# Patient Record
Sex: MALE | Race: WHITE | NOT HISPANIC OR LATINO | Employment: FULL TIME | URBAN - METROPOLITAN AREA
[De-identification: names, ages, dates, MRNs, and addresses within clinical notes are randomized per-mention and may not be internally consistent; named-entity substitution may affect disease eponyms.]

---

## 2019-07-08 ENCOUNTER — TELEPHONE (OUTPATIENT)
Dept: FAMILY MEDICINE CLINIC | Facility: CLINIC | Age: 39
End: 2019-07-08

## 2019-07-08 RX ORDER — DEXTROAMPHETAMINE SACCHARATE, AMPHETAMINE ASPARTATE, DEXTROAMPHETAMINE SULFATE AND AMPHETAMINE SULFATE 5; 5; 5; 5 MG/1; MG/1; MG/1; MG/1
20 TABLET ORAL 2 TIMES DAILY
Qty: 60 TABLET | Refills: 0 | Status: SHIPPED | OUTPATIENT
Start: 2019-07-08 | End: 2019-08-16 | Stop reason: SDUPTHER

## 2019-08-16 ENCOUNTER — TELEPHONE (OUTPATIENT)
Dept: FAMILY MEDICINE CLINIC | Facility: CLINIC | Age: 39
End: 2019-08-16

## 2019-08-16 RX ORDER — DEXTROAMPHETAMINE SACCHARATE, AMPHETAMINE ASPARTATE, DEXTROAMPHETAMINE SULFATE AND AMPHETAMINE SULFATE 5; 5; 5; 5 MG/1; MG/1; MG/1; MG/1
20 TABLET ORAL 2 TIMES DAILY
Qty: 60 TABLET | Refills: 0 | Status: SHIPPED | OUTPATIENT
Start: 2019-08-16 | End: 2019-09-25 | Stop reason: SDUPTHER

## 2019-08-16 NOTE — TELEPHONE ENCOUNTER
Patient left  requesting refill for     amphetamine-dextroamphetamine (ADDERALL) 20 MG    Patient states that he will be leaving town in the morning and is asking if script can be sent in today to [facility] .

## 2019-09-25 ENCOUNTER — TELEPHONE (OUTPATIENT)
Dept: FAMILY MEDICINE CLINIC | Facility: CLINIC | Age: 39
End: 2019-09-25

## 2019-09-25 RX ORDER — DEXTROAMPHETAMINE SACCHARATE, AMPHETAMINE ASPARTATE, DEXTROAMPHETAMINE SULFATE AND AMPHETAMINE SULFATE 5; 5; 5; 5 MG/1; MG/1; MG/1; MG/1
20 TABLET ORAL 2 TIMES DAILY
Qty: 60 TABLET | Refills: 0 | Status: SHIPPED | OUTPATIENT
Start: 2019-09-25 | End: 2019-11-07 | Stop reason: SDUPTHER

## 2019-09-25 NOTE — TELEPHONE ENCOUNTER
Patient called requesting refill for     amphetamine-dextroamphetamine (ADDERALL) 20 MG    Patient would like script sent to [facility]

## 2019-11-07 ENCOUNTER — TELEPHONE (OUTPATIENT)
Dept: FAMILY MEDICINE CLINIC | Facility: CLINIC | Age: 39
End: 2019-11-07

## 2019-11-07 RX ORDER — DEXTROAMPHETAMINE SACCHARATE, AMPHETAMINE ASPARTATE, DEXTROAMPHETAMINE SULFATE AND AMPHETAMINE SULFATE 5; 5; 5; 5 MG/1; MG/1; MG/1; MG/1
20 TABLET ORAL 2 TIMES DAILY
Qty: 60 TABLET | Refills: 0 | Status: SHIPPED | OUTPATIENT
Start: 2019-11-07 | End: 2019-12-17 | Stop reason: SDUPTHER

## 2019-12-17 ENCOUNTER — TELEPHONE (OUTPATIENT)
Dept: FAMILY MEDICINE CLINIC | Facility: CLINIC | Age: 39
End: 2019-12-17

## 2019-12-17 DIAGNOSIS — F98.8 ATTENTION DEFICIT DISORDER (ADD) WITHOUT HYPERACTIVITY: Primary | ICD-10-CM

## 2019-12-17 RX ORDER — DEXTROAMPHETAMINE SACCHARATE, AMPHETAMINE ASPARTATE, DEXTROAMPHETAMINE SULFATE AND AMPHETAMINE SULFATE 5; 5; 5; 5 MG/1; MG/1; MG/1; MG/1
20 TABLET ORAL 2 TIMES DAILY
Qty: 60 TABLET | Refills: 0 | Status: SHIPPED | OUTPATIENT
Start: 2019-12-17 | End: 2020-01-24 | Stop reason: SDUPTHER

## 2020-01-17 ENCOUNTER — OFFICE VISIT (OUTPATIENT)
Dept: FAMILY MEDICINE CLINIC | Facility: CLINIC | Age: 40
End: 2020-01-17

## 2020-01-17 VITALS
DIASTOLIC BLOOD PRESSURE: 80 MMHG | WEIGHT: 140 LBS | OXYGEN SATURATION: 98 % | TEMPERATURE: 97.9 F | HEIGHT: 66 IN | BODY MASS INDEX: 22.5 KG/M2 | SYSTOLIC BLOOD PRESSURE: 112 MMHG | HEART RATE: 74 BPM | RESPIRATION RATE: 16 BRPM

## 2020-01-17 DIAGNOSIS — M79.642 HAND PAIN, LEFT: ICD-10-CM

## 2020-01-17 DIAGNOSIS — M35.9 CONNECTIVE TISSUE DISEASE (HCC): Primary | ICD-10-CM

## 2020-01-17 DIAGNOSIS — M25.521 RIGHT ELBOW PAIN: ICD-10-CM

## 2020-01-17 DIAGNOSIS — M72.0 DUPUYTREN'S CONTRACTURE OF RIGHT HAND: ICD-10-CM

## 2020-01-17 PROBLEM — G47.00 INSOMNIA: Status: ACTIVE | Noted: 2017-12-08

## 2020-01-17 PROBLEM — Z00.00 PREVENTATIVE HEALTH CARE: Status: ACTIVE | Noted: 2019-02-01

## 2020-01-17 PROBLEM — T78.40XA ALLERGIC STATE: Status: ACTIVE | Noted: 2020-01-17

## 2020-01-17 PROBLEM — F90.9 ATTENTION-DEFICIT HYPERACTIVITY DISORDER: Status: ACTIVE | Noted: 2017-12-08

## 2020-01-17 LAB
ALBUMIN SERPL-MCNC: 4.8 G/DL (ref 3.5–5.2)
ALBUMIN/GLOB SERPL: 1.7 G/DL
ALP SERPL-CCNC: 90 U/L (ref 39–117)
ALT SERPL W P-5'-P-CCNC: 20 U/L (ref 1–41)
ANION GAP SERPL CALCULATED.3IONS-SCNC: 14.1 MMOL/L (ref 5–15)
AST SERPL-CCNC: 22 U/L (ref 1–40)
BILIRUB SERPL-MCNC: 0.5 MG/DL (ref 0.2–1.2)
BUN BLD-MCNC: 7 MG/DL (ref 6–20)
BUN/CREAT SERPL: 8.6 (ref 7–25)
CALCIUM SPEC-SCNC: 9.5 MG/DL (ref 8.6–10.5)
CHLORIDE SERPL-SCNC: 103 MMOL/L (ref 98–107)
CHROMATIN AB SERPL-ACNC: <10 IU/ML (ref 0–14)
CO2 SERPL-SCNC: 23.9 MMOL/L (ref 22–29)
CREAT BLD-MCNC: 0.81 MG/DL (ref 0.76–1.27)
CRP SERPL-MCNC: 0.12 MG/DL (ref 0–0.5)
ERYTHROCYTE [SEDIMENTATION RATE] IN BLOOD: 1 MM/HR (ref 0–15)
GFR SERPL CREATININE-BSD FRML MDRD: 107 ML/MIN/1.73
GLOBULIN UR ELPH-MCNC: 2.9 GM/DL
GLUCOSE BLD-MCNC: 78 MG/DL (ref 65–99)
POTASSIUM BLD-SCNC: 4.6 MMOL/L (ref 3.5–5.2)
PROT SERPL-MCNC: 7.7 G/DL (ref 6–8.5)
SODIUM BLD-SCNC: 141 MMOL/L (ref 136–145)

## 2020-01-17 PROCEDURE — 99213 OFFICE O/P EST LOW 20 MIN: CPT | Performed by: FAMILY MEDICINE

## 2020-01-17 PROCEDURE — 86140 C-REACTIVE PROTEIN: CPT | Performed by: FAMILY MEDICINE

## 2020-01-17 PROCEDURE — 80053 COMPREHEN METABOLIC PANEL: CPT | Performed by: FAMILY MEDICINE

## 2020-01-17 PROCEDURE — 86038 ANTINUCLEAR ANTIBODIES: CPT | Performed by: FAMILY MEDICINE

## 2020-01-17 PROCEDURE — 85652 RBC SED RATE AUTOMATED: CPT | Performed by: FAMILY MEDICINE

## 2020-01-17 PROCEDURE — 86431 RHEUMATOID FACTOR QUANT: CPT | Performed by: FAMILY MEDICINE

## 2020-01-17 RX ORDER — INDOMETHACIN 25 MG/1
25 CAPSULE ORAL 3 TIMES DAILY PRN
Qty: 90 CAPSULE | Refills: 6 | Status: SHIPPED | OUTPATIENT
Start: 2020-01-17 | End: 2020-02-16

## 2020-01-20 LAB — ANA SER QL: NEGATIVE

## 2020-01-21 ENCOUNTER — TELEPHONE (OUTPATIENT)
Dept: FAMILY MEDICINE CLINIC | Facility: CLINIC | Age: 40
End: 2020-01-21

## 2020-01-21 NOTE — TELEPHONE ENCOUNTER
Pt requesting to be called with his most recent labs results. Only call [phone] with results. Thank you.

## 2020-01-23 ENCOUNTER — TELEPHONE (OUTPATIENT)
Dept: FAMILY MEDICINE CLINIC | Facility: CLINIC | Age: 40
End: 2020-01-23

## 2020-01-24 DIAGNOSIS — F98.8 ATTENTION DEFICIT DISORDER (ADD) WITHOUT HYPERACTIVITY: ICD-10-CM

## 2020-01-24 RX ORDER — DEXTROAMPHETAMINE SACCHARATE, AMPHETAMINE ASPARTATE, DEXTROAMPHETAMINE SULFATE AND AMPHETAMINE SULFATE 5; 5; 5; 5 MG/1; MG/1; MG/1; MG/1
20 TABLET ORAL 2 TIMES DAILY
Qty: 60 TABLET | Refills: 0 | Status: SHIPPED | OUTPATIENT
Start: 2020-01-24 | End: 2020-03-04 | Stop reason: SDUPTHER

## 2020-03-04 DIAGNOSIS — F98.8 ATTENTION DEFICIT DISORDER (ADD) WITHOUT HYPERACTIVITY: ICD-10-CM

## 2020-03-04 RX ORDER — DEXTROAMPHETAMINE SACCHARATE, AMPHETAMINE ASPARTATE, DEXTROAMPHETAMINE SULFATE AND AMPHETAMINE SULFATE 5; 5; 5; 5 MG/1; MG/1; MG/1; MG/1
20 TABLET ORAL 2 TIMES DAILY
Qty: 60 TABLET | Refills: 0 | Status: SHIPPED | OUTPATIENT
Start: 2020-03-04 | End: 2020-04-03 | Stop reason: SDUPTHER

## 2020-03-04 NOTE — TELEPHONE ENCOUNTER
PT CALLED REQUESTING A REFILL FOR amphetamine-dextroamphetamine (ADDERALL) 20 MG tablet    AMANDA ODELL CONFIRMED     PT CALL BACK   [phone]

## 2020-03-17 PROBLEM — M35.9 CONNECTIVE TISSUE DISEASE: Status: ACTIVE | Noted: 2020-03-17

## 2020-03-17 NOTE — ASSESSMENT & PLAN NOTE
Patient has Dupuytren's contracture involving his middle and ring finger.  It is not severe and I do not think it had anything to do with his pain.

## 2020-03-17 NOTE — ASSESSMENT & PLAN NOTE
Patient has right lateral epicondylitis.  This came on at the same time that the dorsum of his hand was inflamed and tender the epicondyle is still rather tender though and bothers him even on the day of this exam.  We will have him apply heat do exercises if it does not get better soon we will have to bring him back for an injection.

## 2020-03-17 NOTE — ASSESSMENT & PLAN NOTE
His hand pain is over the dorsum of his hand and was probably a tendinitis of some sort.  Possibly a fasciitis.  Practically gone at present time and his  is back to normal.

## 2020-03-17 NOTE — ASSESSMENT & PLAN NOTE
He describes a episode of inflammation in the dorsum of his hand and wrist that sounded very inflammatory.  I have to wonder about some sort of connective tissue disease which attacked  his hands and wrist.  He is much better but we will check his ALBERTINA and sed rate and see if there is any evidence of inflammatory antibodies in his system.

## 2020-04-03 DIAGNOSIS — F98.8 ATTENTION DEFICIT DISORDER (ADD) WITHOUT HYPERACTIVITY: ICD-10-CM

## 2020-04-03 RX ORDER — DEXTROAMPHETAMINE SACCHARATE, AMPHETAMINE ASPARTATE, DEXTROAMPHETAMINE SULFATE AND AMPHETAMINE SULFATE 5; 5; 5; 5 MG/1; MG/1; MG/1; MG/1
20 TABLET ORAL 2 TIMES DAILY
Qty: 60 TABLET | Refills: 0 | Status: SHIPPED | OUTPATIENT
Start: 2020-04-03 | End: 2020-05-29 | Stop reason: SDUPTHER

## 2020-04-03 NOTE — TELEPHONE ENCOUNTER
PT REQUESTED REFILLS FOR amphetamine-dextroamphetamine (ADDERALL) 20 MG tablet    PT CALLBACK [phone]    PHARMACY CONFIRMED

## 2020-05-29 DIAGNOSIS — F98.8 ATTENTION DEFICIT DISORDER (ADD) WITHOUT HYPERACTIVITY: ICD-10-CM

## 2020-05-29 NOTE — TELEPHONE ENCOUNTER
PT CALLED REQUESTING A REFILL FOR amphetamine-dextroamphetamine (Adderall) 20 MG tablet    AMANDA CONFIRMED     PT CALL BACK   [phone]

## 2020-05-30 RX ORDER — DEXTROAMPHETAMINE SACCHARATE, AMPHETAMINE ASPARTATE, DEXTROAMPHETAMINE SULFATE AND AMPHETAMINE SULFATE 5; 5; 5; 5 MG/1; MG/1; MG/1; MG/1
20 TABLET ORAL 2 TIMES DAILY
Qty: 60 TABLET | Refills: 0 | Status: SHIPPED | OUTPATIENT
Start: 2020-05-30 | End: 2020-07-17 | Stop reason: SDUPTHER

## 2020-07-17 DIAGNOSIS — F98.8 ATTENTION DEFICIT DISORDER (ADD) WITHOUT HYPERACTIVITY: ICD-10-CM

## 2020-07-17 NOTE — TELEPHONE ENCOUNTER
Patient is calling to request a  Refill of the following:    amphetamine-dextroamphetamine (Adderall) 20 MG tablet    [id number] - [place] - [address] - [phone]  - [phone] FX  [phone]    Patient call back [phone]

## 2020-07-18 RX ORDER — DEXTROAMPHETAMINE SACCHARATE, AMPHETAMINE ASPARTATE, DEXTROAMPHETAMINE SULFATE AND AMPHETAMINE SULFATE 5; 5; 5; 5 MG/1; MG/1; MG/1; MG/1
20 TABLET ORAL 2 TIMES DAILY
Qty: 60 TABLET | Refills: 0 | Status: SHIPPED | OUTPATIENT
Start: 2020-07-18 | End: 2020-08-25 | Stop reason: SDUPTHER

## 2020-08-25 DIAGNOSIS — F98.8 ATTENTION DEFICIT DISORDER (ADD) WITHOUT HYPERACTIVITY: ICD-10-CM

## 2020-08-25 RX ORDER — DEXTROAMPHETAMINE SACCHARATE, AMPHETAMINE ASPARTATE, DEXTROAMPHETAMINE SULFATE AND AMPHETAMINE SULFATE 5; 5; 5; 5 MG/1; MG/1; MG/1; MG/1
20 TABLET ORAL 2 TIMES DAILY
Qty: 60 TABLET | Refills: 0 | Status: SHIPPED | OUTPATIENT
Start: 2020-08-25 | End: 2020-10-12 | Stop reason: SDUPTHER

## 2020-08-25 NOTE — TELEPHONE ENCOUNTER
DELETE AFTER REVIEWING: Telephone encounter to be sent to the clinical pool.  If patient has less than a 3 day supply left, send the encounter HIGH Priority.    Caller: Tim Bladimir S    Relationship: Self    Best call back number:[phone]   Medication needed:   Requested Prescriptions     Pending Prescriptions Disp Refills   • amphetamine-dextroamphetamine (Adderall) 20 MG tablet 60 tablet 0     Sig: Take 1 tablet by mouth 2 (Two) Times a Day.       When do you need the refill by: Thursday     What details did the patient provide when requesting the medication:    Thursday   Does the patient have less than a 3 day supply:  [] Yes  [x] No    What is the patient's preferred pharmacy: [facility] #[id number] - [place], [place] - [address] AT [address] OF [address] - [phone]  - [phone] FX

## 2020-09-25 ENCOUNTER — TELEPHONE (OUTPATIENT)
Dept: FAMILY MEDICINE CLINIC | Facility: CLINIC | Age: 40
End: 2020-09-25

## 2020-09-25 NOTE — TELEPHONE ENCOUNTER
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     Fax # [phone]  Attn To Jagruti ANAYA      Thank you

## 2020-09-28 ENCOUNTER — TELEPHONE (OUTPATIENT)
Dept: FAMILY MEDICINE CLINIC | Facility: CLINIC | Age: 40
End: 2020-09-28

## 2020-09-28 NOTE — TELEPHONE ENCOUNTER
Patient called stated that he was notified by the office that the paperwork was faxed to  [facility]  Fax Number [phone].  Patient stated that they have not received the paperwork.  Please resend paperwork.    [phone]

## 2020-10-12 DIAGNOSIS — F98.8 ATTENTION DEFICIT DISORDER (ADD) WITHOUT HYPERACTIVITY: ICD-10-CM

## 2020-10-12 NOTE — TELEPHONE ENCOUNTER
PATIENT IS CALLING NEEDING TO GET A REFILL amphetamine-dextroamphetamine (Adderall) 20 MG tablet    PATIENT ONLY HAS 2 DAY LEFT      PHARMACY:  [facility] #[id number] - [place], [place] - [address] - [phone]  - [phone]         PT CONTACT@[phone]

## 2020-10-13 RX ORDER — DEXTROAMPHETAMINE SACCHARATE, AMPHETAMINE ASPARTATE, DEXTROAMPHETAMINE SULFATE AND AMPHETAMINE SULFATE 5; 5; 5; 5 MG/1; MG/1; MG/1; MG/1
20 TABLET ORAL 2 TIMES DAILY
Qty: 60 TABLET | Refills: 0 | Status: SHIPPED | OUTPATIENT
Start: 2020-10-13 | End: 2020-11-30 | Stop reason: SDUPTHER

## 2020-11-30 ENCOUNTER — TELEPHONE (OUTPATIENT)
Dept: FAMILY MEDICINE CLINIC | Facility: CLINIC | Age: 40
End: 2020-11-30

## 2020-11-30 DIAGNOSIS — F98.8 ATTENTION DEFICIT DISORDER (ADD) WITHOUT HYPERACTIVITY: ICD-10-CM

## 2020-11-30 RX ORDER — DEXTROAMPHETAMINE SACCHARATE, AMPHETAMINE ASPARTATE, DEXTROAMPHETAMINE SULFATE AND AMPHETAMINE SULFATE 5; 5; 5; 5 MG/1; MG/1; MG/1; MG/1
20 TABLET ORAL 2 TIMES DAILY
Qty: 60 TABLET | Refills: 0 | Status: SHIPPED | OUTPATIENT
Start: 2020-11-30 | End: 2021-01-12 | Stop reason: SDUPTHER

## 2020-11-30 NOTE — TELEPHONE ENCOUNTER
Caller: Bladimir Dumont    Relationship: Self    Best call back number: [phone]    Medication needed: amphetamine-dextroamphetamine (Adderall) 20 MG tablet    When do you need the refill by: ASAP     What details did the patient provide when requesting the medication patient is out of medication     Does the patient have less than a 3 day supply:  [x] Yes  [] No    What is the patient's preferred pharmacy:  [facility] #[id number] - [place], [place] - [address] - [phone]  - [phone] FX  [phone]

## 2021-01-12 DIAGNOSIS — F98.8 ATTENTION DEFICIT DISORDER (ADD) WITHOUT HYPERACTIVITY: ICD-10-CM

## 2021-01-12 RX ORDER — DEXTROAMPHETAMINE SACCHARATE, AMPHETAMINE ASPARTATE, DEXTROAMPHETAMINE SULFATE AND AMPHETAMINE SULFATE 5; 5; 5; 5 MG/1; MG/1; MG/1; MG/1
20 TABLET ORAL 2 TIMES DAILY
Qty: 60 TABLET | Refills: 0 | Status: SHIPPED | OUTPATIENT
Start: 2021-01-12 | End: 2021-01-18 | Stop reason: SDUPTHER

## 2021-01-12 NOTE — TELEPHONE ENCOUNTER
Caller: Bladimir Dumont S    Relationship: Self    Best call back number: [phone]      Medication needed:   Requested Prescriptions     Pending Prescriptions Disp Refills   • amphetamine-dextroamphetamine (Adderall) 20 MG tablet 60 tablet 0     Sig: Take 1 tablet by mouth 2 (Two) Times a Day.       When do you need the refill by: 1/15/21    What details did the patient provide when requesting the medication: PATIENT HAS A 3 DAY SUPPLY LEFT, GOT HIM IN FOR SOONEST APPOINTMENT WITH DR INFANTE IN MARCH.     Does the patient have less than a 3 day supply:  [] Yes  [x] No    What is the patient's preferred pharmacy: [id number] [facility] [place] [place] - [address] - [phone]  - [phone] FX

## 2021-01-18 ENCOUNTER — TELEPHONE (OUTPATIENT)
Dept: FAMILY MEDICINE CLINIC | Facility: CLINIC | Age: 41
End: 2021-01-18

## 2021-01-18 DIAGNOSIS — F98.8 ATTENTION DEFICIT DISORDER (ADD) WITHOUT HYPERACTIVITY: ICD-10-CM

## 2021-01-18 RX ORDER — DEXTROAMPHETAMINE SACCHARATE, AMPHETAMINE ASPARTATE, DEXTROAMPHETAMINE SULFATE AND AMPHETAMINE SULFATE 5; 5; 5; 5 MG/1; MG/1; MG/1; MG/1
20 TABLET ORAL 2 TIMES DAILY
Qty: 60 TABLET | Refills: 0 | Status: SHIPPED | OUTPATIENT
Start: 2021-01-18 | End: 2021-03-04 | Stop reason: SDUPTHER

## 2021-03-04 DIAGNOSIS — F98.8 ATTENTION DEFICIT DISORDER (ADD) WITHOUT HYPERACTIVITY: ICD-10-CM

## 2021-03-04 RX ORDER — DEXTROAMPHETAMINE SACCHARATE, AMPHETAMINE ASPARTATE, DEXTROAMPHETAMINE SULFATE AND AMPHETAMINE SULFATE 5; 5; 5; 5 MG/1; MG/1; MG/1; MG/1
20 TABLET ORAL 2 TIMES DAILY
Qty: 60 TABLET | Refills: 0 | Status: SHIPPED | OUTPATIENT
Start: 2021-03-04 | End: 2021-04-15 | Stop reason: SDUPTHER

## 2021-03-04 NOTE — TELEPHONE ENCOUNTER
Caller: Bladimir Dumont    Relationship: Self    Best call back number: [phone]     Medication needed:   Requested Prescriptions     Pending Prescriptions Disp Refills   • amphetamine-dextroamphetamine (Adderall) 20 MG tablet 60 tablet 0     Sig: Take 1 tablet by mouth 2 (Two) Times a Day for 30 days.       When do you need the refill by: TODAY    What details did the patient provide when requesting the medication:   2 DAYS OF MEDICATION REMAINING   Does the patient have less than a 3 day supply:  [x] Yes  [] No    What is the patient's preferred pharmacy: [facility] #[id number] - [facility] [place], [place] - [address] - [phone]  - [phone] FX

## 2021-03-17 ENCOUNTER — OFFICE VISIT (OUTPATIENT)
Dept: FAMILY MEDICINE CLINIC | Facility: CLINIC | Age: 41
End: 2021-03-17

## 2021-03-17 VITALS
BODY MASS INDEX: 27.97 KG/M2 | SYSTOLIC BLOOD PRESSURE: 114 MMHG | HEIGHT: 66 IN | DIASTOLIC BLOOD PRESSURE: 78 MMHG | HEART RATE: 87 BPM | TEMPERATURE: 97.1 F | WEIGHT: 174 LBS | OXYGEN SATURATION: 98 % | RESPIRATION RATE: 16 BRPM

## 2021-03-17 DIAGNOSIS — F43.0 ACUTE REACTION TO SITUATIONAL STRESS: Primary | ICD-10-CM

## 2021-03-17 DIAGNOSIS — F34.1 SECONDARY DYSTHYMIA: ICD-10-CM

## 2021-03-17 PROCEDURE — 99213 OFFICE O/P EST LOW 20 MIN: CPT | Performed by: FAMILY MEDICINE

## 2021-03-17 RX ORDER — FLUOXETINE HYDROCHLORIDE 20 MG/1
CAPSULE ORAL
Qty: 45 CAPSULE | Refills: 0 | Status: SHIPPED | OUTPATIENT
Start: 2021-03-17 | End: 2021-05-14

## 2021-03-17 RX ORDER — FLUOXETINE HYDROCHLORIDE 20 MG/1
CAPSULE ORAL
Qty: 45 CAPSULE | Refills: 0 | Status: SHIPPED | OUTPATIENT
Start: 2021-03-17 | End: 2021-03-17

## 2021-03-17 NOTE — ASSESSMENT & PLAN NOTE
Patient's depression is single episode and is moderate without psychosis. Their depression is currently active and the condition is newly identified. This will be reassessed in 4 weeks. F/U as described:patient will continue current medication therapy and patient was prescribed an antidepressant medicine.

## 2021-04-15 DIAGNOSIS — F98.8 ATTENTION DEFICIT DISORDER (ADD) WITHOUT HYPERACTIVITY: ICD-10-CM

## 2021-04-15 RX ORDER — DEXTROAMPHETAMINE SACCHARATE, AMPHETAMINE ASPARTATE, DEXTROAMPHETAMINE SULFATE AND AMPHETAMINE SULFATE 5; 5; 5; 5 MG/1; MG/1; MG/1; MG/1
20 TABLET ORAL 2 TIMES DAILY
Qty: 60 TABLET | Refills: 0 | Status: SHIPPED | OUTPATIENT
Start: 2021-04-15 | End: 2021-05-20 | Stop reason: SDUPTHER

## 2021-04-15 NOTE — TELEPHONE ENCOUNTER
.    Caller: Bladimir Dumont    Relationship: Self    Best call back number: [phone]    Medication needed:   Requested Prescriptions     Pending Prescriptions Disp Refills   • amphetamine-dextroamphetamine (Adderall) 20 MG tablet 60 tablet 0     Sig: Take 1 tablet by mouth 2 (Two) Times a Day for 30 days.       When do you need the refill by: TODAY    What additional details did the patient provide when requesting the medication: ONE DAY LEFT    Does the patient have less than a 3 day supply:  [x] Yes  [] No    What is the patient's preferred pharmacy:

## 2021-05-14 RX ORDER — FLUOXETINE HYDROCHLORIDE 20 MG/1
CAPSULE ORAL
Qty: 45 CAPSULE | Refills: 0 | Status: SHIPPED | OUTPATIENT
Start: 2021-05-14 | End: 2022-03-15

## 2021-05-20 DIAGNOSIS — F98.8 ATTENTION DEFICIT DISORDER (ADD) WITHOUT HYPERACTIVITY: ICD-10-CM

## 2021-05-24 RX ORDER — DEXTROAMPHETAMINE SACCHARATE, AMPHETAMINE ASPARTATE, DEXTROAMPHETAMINE SULFATE AND AMPHETAMINE SULFATE 5; 5; 5; 5 MG/1; MG/1; MG/1; MG/1
20 TABLET ORAL 2 TIMES DAILY
Qty: 60 TABLET | Refills: 0 | Status: SHIPPED | OUTPATIENT
Start: 2021-05-24 | End: 2021-06-21 | Stop reason: SDUPTHER

## 2021-05-24 NOTE — TELEPHONE ENCOUNTER
Caller: Iván Mckinney    Relationship: Self    Best call back number: [phone]    What is the best time to reach you: ANYTIME    Who are you requesting to speak with (clinical staff, provider,  specific staff member): CLINICAL STAFF    Do you know the name of the person who called: IVÁN MCKINNEY    What was the call regarding: PATIENT IS COMPLETELY OUT OF ADDERALL, WANTING TO CHECK ON REFILL    Do you require a callback:NO

## 2021-06-21 DIAGNOSIS — F98.8 ATTENTION DEFICIT DISORDER (ADD) WITHOUT HYPERACTIVITY: ICD-10-CM

## 2021-06-21 NOTE — TELEPHONE ENCOUNTER
D  Caller: Bladimir Mckinney    Relationship: Self    Best call back number:[phone]   Medication needed:   amphetamine-dextroamphetamine (Adderall) 20 MG tablet  20 mg, 2 Times Daily 0 ordered  EditCancel Reorder       Summary: Take 1 tablet by mouth 2 (Two) Times a Day for 30 days., Starting Mon 5/24/2021, Until Wed 6/23/2021, Normal                 When do you need the refill by: TODAY    What additional details did the patient provide when requesting the medication:     MR MCKINNEY IS WANTING AN EARLY RELEASE OF HIS MEDICATION, HE WILL BE [place]AV[place] ON Wednesday MORNING .     WILL BE GONE FOR 2 WEEKS, HE WILL BE OUT OF MEDICATION   Does the patient have less than a 3 day supply:  [] Yes  [x] No    What is the patient's preferred pharmacy:        [facility][id number] - [facility] [place] [place] - [address] - [phone]  - [phone]   [phone]

## 2021-06-22 RX ORDER — DEXTROAMPHETAMINE SACCHARATE, AMPHETAMINE ASPARTATE, DEXTROAMPHETAMINE SULFATE AND AMPHETAMINE SULFATE 5; 5; 5; 5 MG/1; MG/1; MG/1; MG/1
20 TABLET ORAL 2 TIMES DAILY
Qty: 60 TABLET | Refills: 0 | Status: SHIPPED | OUTPATIENT
Start: 2021-06-22 | End: 2021-07-26 | Stop reason: SDUPTHER

## 2021-07-26 DIAGNOSIS — F98.8 ATTENTION DEFICIT DISORDER (ADD) WITHOUT HYPERACTIVITY: ICD-10-CM

## 2021-07-26 NOTE — TELEPHONE ENCOUNTER
Caller: Bladimir Dumont    Relationship: Self    Best call back number:[phone]      Medication needed:   Requested Prescriptions     Pending Prescriptions Disp Refills   • amphetamine-dextroamphetamine (Adderall) 20 MG tablet 60 tablet 0     Sig: Take 1 tablet by mouth 2 (Two) Times a Day for 30 days.       When do you need the refill by: 7/26/21    What additional details did the patient provide when requesting the medication: TOOK LAST DOSE 7/26/21    Does the patient have less than a 3 day supply:  [x] Yes  [] No    What is the patient's preferred pharmacy: [id number] AT [facility] OF  [address] & [facility] - [phone] [place] [phone] FX

## 2021-07-27 RX ORDER — DEXTROAMPHETAMINE SACCHARATE, AMPHETAMINE ASPARTATE, DEXTROAMPHETAMINE SULFATE AND AMPHETAMINE SULFATE 5; 5; 5; 5 MG/1; MG/1; MG/1; MG/1
20 TABLET ORAL 2 TIMES DAILY
Qty: 60 TABLET | Refills: 0 | Status: SHIPPED | OUTPATIENT
Start: 2021-07-27 | End: 2021-09-02 | Stop reason: SDUPTHER

## 2021-09-02 DIAGNOSIS — F98.8 ATTENTION DEFICIT DISORDER (ADD) WITHOUT HYPERACTIVITY: ICD-10-CM

## 2021-09-02 RX ORDER — DEXTROAMPHETAMINE SACCHARATE, AMPHETAMINE ASPARTATE, DEXTROAMPHETAMINE SULFATE AND AMPHETAMINE SULFATE 5; 5; 5; 5 MG/1; MG/1; MG/1; MG/1
20 TABLET ORAL 2 TIMES DAILY
Qty: 60 TABLET | Refills: 0 | Status: SHIPPED | OUTPATIENT
Start: 2021-09-02 | End: 2021-10-20 | Stop reason: SDUPTHER

## 2021-09-02 NOTE — TELEPHONE ENCOUNTER
Caller: Bladimir Dumont    Relationship: Self    Best call back number: [phone]     Medication needed:   Requested Prescriptions     Pending Prescriptions Disp Refills   • amphetamine-dextroamphetamine (Adderall) 20 MG tablet 60 tablet 0     Sig: Take 1 tablet by mouth 2 (Two) Times a Day for 30 days.       When do you need the refill by: TODAY    What additional details did the patient provide when requesting the medication:   OUT OF MEDICATION    Does the patient have less than a 3 day supply:  [x] Yes  [] No    What is the patient's preferred pharmacy: [id number] AT [facility] OF  [address] & [facility] - [phone] [facility] [phone] FX

## 2021-10-20 DIAGNOSIS — F98.8 ATTENTION DEFICIT DISORDER (ADD) WITHOUT HYPERACTIVITY: ICD-10-CM

## 2021-10-20 RX ORDER — DEXTROAMPHETAMINE SACCHARATE, AMPHETAMINE ASPARTATE, DEXTROAMPHETAMINE SULFATE AND AMPHETAMINE SULFATE 5; 5; 5; 5 MG/1; MG/1; MG/1; MG/1
20 TABLET ORAL 2 TIMES DAILY
Qty: 60 TABLET | Refills: 0 | Status: SHIPPED | OUTPATIENT
Start: 2021-10-20 | End: 2021-12-06 | Stop reason: SDUPTHER

## 2021-10-20 NOTE — TELEPHONE ENCOUNTER
Caller: Iván Dumont    Relationship: Self    Requested Prescriptions:   Requested Prescriptions     Pending Prescriptions Disp Refills   • amphetamine-dextroamphetamine (Adderall) 20 MG tablet 60 tablet 0     Sig: Take 1 tablet by mouth 2 (Two) Times a Day for 30 days.        Pharmacy where request should be sent: [facility] #[id number] - [place], [place] - [address] [facility] [address] AT [address] OF [facility] [address] - [phone]  - [phone] FX  [phone]    Additional details provided by patient: IVÁN WILL BE LEAVING [facility] THIS EVENING AND WOULD LIKE TO FILL BEFORE HE LEAVES. ADVISED PATIENT OF UP TO 48 HOUR TIME FRAME    Best call back number: [phone]    Does the patient have less than a 3 day supply:  [] Yes  [x] No    Kristi Akins Rep   10/20/21 12:02 EDT

## 2021-11-09 ENCOUNTER — OFFICE VISIT (OUTPATIENT)
Dept: FAMILY MEDICINE CLINIC | Facility: CLINIC | Age: 41
End: 2021-11-09

## 2021-11-09 ENCOUNTER — LAB (OUTPATIENT)
Dept: FAMILY MEDICINE CLINIC | Facility: CLINIC | Age: 41
End: 2021-11-09

## 2021-11-09 VITALS
DIASTOLIC BLOOD PRESSURE: 80 MMHG | RESPIRATION RATE: 16 BRPM | HEART RATE: 97 BPM | BODY MASS INDEX: 22.98 KG/M2 | TEMPERATURE: 96.9 F | HEIGHT: 66 IN | OXYGEN SATURATION: 99 % | SYSTOLIC BLOOD PRESSURE: 126 MMHG | WEIGHT: 143 LBS

## 2021-11-09 DIAGNOSIS — H65.01 RIGHT ACUTE SEROUS OTITIS MEDIA, RECURRENCE NOT SPECIFIED: ICD-10-CM

## 2021-11-09 DIAGNOSIS — H65.01 RIGHT ACUTE SEROUS OTITIS MEDIA, RECURRENCE NOT SPECIFIED: Primary | ICD-10-CM

## 2021-11-09 PROCEDURE — 87205 SMEAR GRAM STAIN: CPT | Performed by: HOSPITALIST

## 2021-11-09 PROCEDURE — 87077 CULTURE AEROBIC IDENTIFY: CPT | Performed by: HOSPITALIST

## 2021-11-09 PROCEDURE — 99214 OFFICE O/P EST MOD 30 MIN: CPT | Performed by: HOSPITALIST

## 2021-11-09 PROCEDURE — 87186 SC STD MICRODIL/AGAR DIL: CPT | Performed by: HOSPITALIST

## 2021-11-09 PROCEDURE — 87070 CULTURE OTHR SPECIMN AEROBIC: CPT | Performed by: HOSPITALIST

## 2021-11-09 RX ORDER — OFLOXACIN 3 MG/ML
1 SOLUTION/ DROPS OPHTHALMIC 4 TIMES DAILY
Qty: 2 ML | Refills: 0 | Status: SHIPPED | OUTPATIENT
Start: 2021-11-09 | End: 2021-11-19

## 2021-11-09 RX ORDER — METHYLPREDNISOLONE 4 MG/1
TABLET ORAL
Qty: 1 EACH | Refills: 0 | Status: SHIPPED | OUTPATIENT
Start: 2021-11-09 | End: 2022-03-15

## 2021-11-09 RX ORDER — LEVOFLOXACIN 750 MG/1
750 TABLET ORAL DAILY
Qty: 7 TABLET | Refills: 0 | Status: SHIPPED | OUTPATIENT
Start: 2021-11-09 | End: 2022-03-15

## 2021-11-09 NOTE — PROGRESS NOTES
"Subjective   Bladimir Dumont is a 41 y.o. male.     Subjective / HPI  Patient developed pain and discharge from right ear for last one days. Discharge yellowish in color. Pt says he spike fever too last night. No cough, no sore throat, no runny nose.    Review of Systems    Objective     /80 (BP Location: Left arm)   Pulse 97   Temp 96.9 °F (36.1 °C) (Infrared)   Resp 16   Ht 167.6 cm (66\")   Wt 64.9 kg (143 lb)   SpO2 99%   BMI 23.08 kg/m²      Physical Exam  Ear right noted to yellow discharge  Chest .... bilateral entry, NVB.  Procedures       Assessment/Plan   Diagnoses and all orders for this visit:    1. Right acute serous otitis media, recurrence not specified (Primary)  Comments:  will prescribe levaqiun po, oflaxcin ear drop. did culture of discharge from right ear, fup in two week.    Other orders  -     methylPREDNISolone (MEDROL) 4 MG dose pack; Take as directed on package instructions.  Dispense: 1 each; Refill: 0  -     levoFLOXacin (Levaquin) 750 MG tablet; Take 1 tablet by mouth Daily.  Dispense: 7 tablet; Refill: 0  -     ofloxacin (Ocuflox) 0.3 % ophthalmic solution; Administer 1 drop to both eyes 4 (Four) Times a Day for 10 days.  Dispense: 2 mL; Refill: 0                "

## 2021-11-12 LAB
BACTERIA SPEC AEROBE CULT: ABNORMAL
BACTERIA SPEC AEROBE CULT: ABNORMAL
GRAM STN SPEC: ABNORMAL
GRAM STN SPEC: ABNORMAL

## 2021-11-12 NOTE — PROGRESS NOTES
Please call the patient regarding his abnormal result.    Culture from ear positive for sreptococcus pneumonia ..... make sure you finish the course of antibiotics as prescribed.

## 2021-11-15 ENCOUNTER — TELEPHONE (OUTPATIENT)
Dept: FAMILY MEDICINE CLINIC | Facility: CLINIC | Age: 41
End: 2021-11-15

## 2021-11-15 DIAGNOSIS — H65.00 ACUTE SEROUS OTITIS MEDIA, RECURRENCE NOT SPECIFIED, UNSPECIFIED LATERALITY: Primary | ICD-10-CM

## 2021-11-15 NOTE — TELEPHONE ENCOUNTER
I will put the referral for ENT .. if he is spiking fever or pain in intractable, he should go to ER.

## 2021-11-15 NOTE — TELEPHONE ENCOUNTER
Caller: Bladimir Dumont    Relationship to patient: Self    Best call back number: [phone]    Patient is needing: PATIENT CALLED AND SAID HE SAW DR. KENNEDY ON 11/11/21 FOR EAR ACHE AND PAIN, HE SAID THE PAIN HAS IMPROVED IN HIS EAR SINCE TAKING THE MEDICATION HE WAS GIVEN, BUT HE IS STILL FEELING PRESSURE IN HIS EAR AND IT HAS NOT EASED ANY YET    WANTING TO SEE WHAT HE SHOULD DO, IF HE SHOULD BE SEEN OR IF SOMETHING NEEDS TO BE SENT INTO THE PHARMACY FOR HIM    [id number] - [facility] [place] [address] AT [facility] OF  [address] &  [address] - [phone]  - [phone]   [phone]

## 2021-11-23 ENCOUNTER — OFFICE VISIT (OUTPATIENT)
Dept: FAMILY MEDICINE CLINIC | Facility: CLINIC | Age: 41
End: 2021-11-23

## 2021-11-23 DIAGNOSIS — H65.00 ACUTE SEROUS OTITIS MEDIA, RECURRENCE NOT SPECIFIED, UNSPECIFIED LATERALITY: Primary | ICD-10-CM

## 2021-11-23 PROCEDURE — 99443 PR PHYS/QHP TELEPHONE EVALUATION 21-30 MIN: CPT | Performed by: HOSPITALIST

## 2021-11-23 NOTE — PROGRESS NOTES
Subjective   Bladimir Dumont is a 41 y.o. male.     Subjective / HPI  Seen over the phone.  Patient says he did not have any discharge from the ear anymore, he still feels muffled but a lot better than before.  Finding of the cultures grew Streptococcus pneumonia were discussed with the patient.  Patient has the appointment with the ENT tomorrow, will wait on the recommendation    Review of Systems    Objective     There were no vitals taken for this visit.     Physical Exam    Procedures       Assessment/Plan   Diagnoses and all orders for this visit:    1. Acute serous otitis media, recurrence not specified, unspecified laterality (Primary)  Comments:  Patient has a follow-up appointment with ENT tomorrow, will wait on recommendation.  Patient has finished a course of Levaquin orally and ofloxacin drops.         Spent 25 minutes in taking care of this patient over the phone

## 2021-12-06 DIAGNOSIS — F98.8 ATTENTION DEFICIT DISORDER (ADD) WITHOUT HYPERACTIVITY: ICD-10-CM

## 2021-12-06 NOTE — TELEPHONE ENCOUNTER
Caller: Bladimir Dumont S    Relationship: Self    Best call back number:[phone]    Requested Prescriptions:   Requested Prescriptions     Pending Prescriptions Disp Refills   • amphetamine-dextroamphetamine (Adderall) 20 MG tablet 60 tablet 0     Sig: Take 1 tablet by mouth 2 (Two) Times a Day for 30 days.        Pharmacy where request should be sent: [id number] - [place] [address] AT [facility] OF  [address] & [facility] - [phone] [facility] [phone] FX     Additional details provided by patient: PATIENT HAS ONE DAY OF MEDICATION LEFT    Does the patient have less than a 3 day supply:  [x] Yes  [] No    Kristi Akins Rep   12/06/21 15:43 EST

## 2021-12-08 RX ORDER — DEXTROAMPHETAMINE SACCHARATE, AMPHETAMINE ASPARTATE, DEXTROAMPHETAMINE SULFATE AND AMPHETAMINE SULFATE 5; 5; 5; 5 MG/1; MG/1; MG/1; MG/1
20 TABLET ORAL 2 TIMES DAILY
Qty: 60 TABLET | Refills: 0 | Status: SHIPPED | OUTPATIENT
Start: 2021-12-08 | End: 2022-01-07 | Stop reason: SDUPTHER

## 2022-01-07 ENCOUNTER — TELEPHONE (OUTPATIENT)
Dept: FAMILY MEDICINE CLINIC | Facility: CLINIC | Age: 42
End: 2022-01-07

## 2022-01-07 DIAGNOSIS — F98.8 ATTENTION DEFICIT DISORDER (ADD) WITHOUT HYPERACTIVITY: ICD-10-CM

## 2022-01-07 RX ORDER — DEXTROAMPHETAMINE SACCHARATE, AMPHETAMINE ASPARTATE, DEXTROAMPHETAMINE SULFATE AND AMPHETAMINE SULFATE 5; 5; 5; 5 MG/1; MG/1; MG/1; MG/1
20 TABLET ORAL 2 TIMES DAILY
Qty: 60 TABLET | Refills: 0 | Status: SHIPPED | OUTPATIENT
Start: 2022-01-07 | End: 2022-02-18 | Stop reason: SDUPTHER

## 2022-01-07 RX ORDER — DEXTROAMPHETAMINE SACCHARATE, AMPHETAMINE ASPARTATE, DEXTROAMPHETAMINE SULFATE AND AMPHETAMINE SULFATE 5; 5; 5; 5 MG/1; MG/1; MG/1; MG/1
20 TABLET ORAL 2 TIMES DAILY
Qty: 60 TABLET | Refills: 0 | Status: CANCELLED | OUTPATIENT
Start: 2022-01-07 | End: 2022-02-06

## 2022-01-07 NOTE — TELEPHONE ENCOUNTER
Incoming Refill Request      Medication requested (name and dose):   amphetamine-dextroamphetamine (Adderall) 20 MG tablet  20 mg, 2 Times Daily         Pharmacy where request should be sent: [id number] - [place]  [address] [address] [address] AT [facility] OF  &  - [phone]  - [phone] FX  [phone]    Additional details provided by patient: NONE    Best call back number: [phone]    Does the patient have less than a 3 day supply:  [x] Yes  [] No    Kristi Burk Rep  01/07/22, 14:14 EST

## 2022-02-18 DIAGNOSIS — F98.8 ATTENTION DEFICIT DISORDER (ADD) WITHOUT HYPERACTIVITY: ICD-10-CM

## 2022-02-18 NOTE — TELEPHONE ENCOUNTER
Caller: Bladimir Dumont    Relationship: Self    Best call back number: [phone]    Requested Prescriptions:   Requested Prescriptions     Pending Prescriptions Disp Refills   • amphetamine-dextroamphetamine (Adderall) 20 MG tablet 60 tablet 0     Sig: Take 1 tablet by mouth 2 (Two) Times a Day for 30 days.        Pharmacy where request should be sent: [facility] #[id number] - [place] - [address] AT [address] OF [address] & [facility] [address] - [phone]  - [phone] FX       Does the patient have less than a 3 day supply:  [x] Yes  [] No    Kiki Walden, Kristi Rep   02/18/22 13:21 EST

## 2022-02-19 RX ORDER — DEXTROAMPHETAMINE SACCHARATE, AMPHETAMINE ASPARTATE, DEXTROAMPHETAMINE SULFATE AND AMPHETAMINE SULFATE 5; 5; 5; 5 MG/1; MG/1; MG/1; MG/1
20 TABLET ORAL 2 TIMES DAILY
Qty: 60 TABLET | Refills: 0 | Status: SHIPPED | OUTPATIENT
Start: 2022-02-19 | End: 2022-03-22 | Stop reason: SDUPTHER

## 2022-03-15 ENCOUNTER — OFFICE VISIT (OUTPATIENT)
Dept: FAMILY MEDICINE CLINIC | Facility: CLINIC | Age: 42
End: 2022-03-15

## 2022-03-15 VITALS
HEART RATE: 90 BPM | OXYGEN SATURATION: 97 % | RESPIRATION RATE: 10 BRPM | WEIGHT: 141.4 LBS | BODY MASS INDEX: 22.73 KG/M2 | DIASTOLIC BLOOD PRESSURE: 78 MMHG | SYSTOLIC BLOOD PRESSURE: 127 MMHG | HEIGHT: 66 IN | TEMPERATURE: 98.2 F

## 2022-03-15 DIAGNOSIS — H65.04 RECURRENT ACUTE SEROUS OTITIS MEDIA OF RIGHT EAR: Primary | ICD-10-CM

## 2022-03-15 PROCEDURE — 99214 OFFICE O/P EST MOD 30 MIN: CPT | Performed by: PHYSICIAN ASSISTANT

## 2022-03-15 RX ORDER — CEFDINIR 300 MG/1
300 CAPSULE ORAL 2 TIMES DAILY
Qty: 14 CAPSULE | Refills: 0 | Status: SHIPPED | OUTPATIENT
Start: 2022-03-15 | End: 2022-03-22

## 2022-03-15 NOTE — PROGRESS NOTES
"Subjective   Bladimir Dumont is a 41 y.o. male.     Chief Complaint   Patient presents with   • Earache       /78   Pulse 90   Temp 98.2 °F (36.8 °C)   Resp 10   Ht 167.6 cm (66\")   Wt 64.1 kg (141 lb 6.4 oz)   SpO2 97%   BMI 22.82 kg/m²     BP Readings from Last 3 Encounters:   03/15/22 127/78   11/09/21 126/80   03/17/21 114/78       Wt Readings from Last 3 Encounters:   03/15/22 64.1 kg (141 lb 6.4 oz)   11/09/21 64.9 kg (143 lb)   03/17/21 78.9 kg (174 lb)       HPI Presents to the clinic for ear pain that has been present for the past 1 day. He has had a history of recent ear infections and was following with ent. They were going to do tubes at the ENT but he lost insurance as was not able to get this done. No fevers or pain over the mastoid.     The following portions of the patient's history were reviewed and updated as appropriate: allergies, current medications, past family history, past medical history, past social history, past surgical history and problem list.    Review of Systems    Objective   Physical Exam  Constitutional:       Appearance: Normal appearance.   HENT:      Right Ear: External ear normal. There is no impacted cerumen.      Left Ear: External ear normal. There is no impacted cerumen.      Ears:      Comments: Right TM is bulging and erythematous.  Eyes:      Extraocular Movements: Extraocular movements intact.      Pupils: Pupils are equal, round, and reactive to light.   Neurological:      General: No focal deficit present.      Mental Status: He is alert and oriented to person, place, and time.   Psychiatric:         Mood and Affect: Mood normal.         Behavior: Behavior normal.           Diagnoses and all orders for this visit:    1. Recurrent acute serous otitis media of right ear (Primary)  -     Ambulatory Referral to ENT (Otolaryngology)    Other orders  -     cefdinir (OMNICEF) 300 MG capsule; Take 1 capsule by mouth 2 (Two) Times a Day for 7 days.  Dispense: 14 " capsule; Refill: 0        Return if symptoms worsen or fail to improve, for Recheck.

## 2022-03-22 DIAGNOSIS — F98.8 ATTENTION DEFICIT DISORDER (ADD) WITHOUT HYPERACTIVITY: ICD-10-CM

## 2022-03-22 RX ORDER — DEXTROAMPHETAMINE SACCHARATE, AMPHETAMINE ASPARTATE, DEXTROAMPHETAMINE SULFATE AND AMPHETAMINE SULFATE 5; 5; 5; 5 MG/1; MG/1; MG/1; MG/1
20 TABLET ORAL 2 TIMES DAILY
Qty: 60 TABLET | Refills: 0 | Status: SHIPPED | OUTPATIENT
Start: 2022-03-22 | End: 2022-04-29 | Stop reason: SDUPTHER

## 2022-03-22 NOTE — TELEPHONE ENCOUNTER
Caller: Bladimir Dumont    Relationship: Self    Best call back number: [phone]     Requested Prescriptions:   Requested Prescriptions     Pending Prescriptions Disp Refills   • amphetamine-dextroamphetamine (Adderall) 20 MG tablet 60 tablet 0     Sig: Take 1 tablet by mouth 2 (Two) Times a Day for 30 days.        Pharmacy where request should be sent: [id number] - [address] [address] AT [facility] OF  [address] & [facility] - [phone] [facility] [phone] FX     Additional details provided by patient:1 DAY OF MEDICATION REMAINING    Does the patient have less than a 3 day supply:  [x] Yes  [] No    Can Flores   03/22/22 11:25 EDT

## 2022-04-29 DIAGNOSIS — F98.8 ATTENTION DEFICIT DISORDER (ADD) WITHOUT HYPERACTIVITY: ICD-10-CM

## 2022-04-29 NOTE — TELEPHONE ENCOUNTER
Caller: Bladimir Dumont S    Relationship: Self    Best call back number:[phone]    Requested Prescriptions:   Requested Prescriptions     Pending Prescriptions Disp Refills   • amphetamine-dextroamphetamine (Adderall) 20 MG tablet 60 tablet 0     Sig: Take 1 tablet by mouth 2 (Two) Times a Day for 30 days.        Pharmacy where request should be sent: [id number] - [address] [address] AT [facility] OF  [address] & [facility] - [phone] [facility] [phone] FX     Additional details provided by patient: PATIENT IS OUT    Does the patient have less than a 3 day supply:  [] Yes  [] No    Kristi Akins Rep   04/29/22 09:23 EDT

## 2022-04-30 RX ORDER — DEXTROAMPHETAMINE SACCHARATE, AMPHETAMINE ASPARTATE, DEXTROAMPHETAMINE SULFATE AND AMPHETAMINE SULFATE 5; 5; 5; 5 MG/1; MG/1; MG/1; MG/1
20 TABLET ORAL 2 TIMES DAILY
Qty: 60 TABLET | Refills: 0 | Status: SHIPPED | OUTPATIENT
Start: 2022-04-30 | End: 2022-05-31 | Stop reason: SDUPTHER

## 2022-05-31 DIAGNOSIS — F98.8 ATTENTION DEFICIT DISORDER (ADD) WITHOUT HYPERACTIVITY: ICD-10-CM

## 2022-06-03 RX ORDER — DEXTROAMPHETAMINE SACCHARATE, AMPHETAMINE ASPARTATE, DEXTROAMPHETAMINE SULFATE AND AMPHETAMINE SULFATE 5; 5; 5; 5 MG/1; MG/1; MG/1; MG/1
20 TABLET ORAL 2 TIMES DAILY
Qty: 60 TABLET | Refills: 0 | Status: SHIPPED | OUTPATIENT
Start: 2022-06-03 | End: 2022-06-29

## 2022-06-29 ENCOUNTER — OFFICE VISIT (OUTPATIENT)
Dept: FAMILY MEDICINE CLINIC | Facility: CLINIC | Age: 42
End: 2022-06-29

## 2022-06-29 VITALS
RESPIRATION RATE: 16 BRPM | BODY MASS INDEX: 21.86 KG/M2 | TEMPERATURE: 97.6 F | HEIGHT: 66 IN | HEART RATE: 87 BPM | WEIGHT: 136 LBS | OXYGEN SATURATION: 98 % | SYSTOLIC BLOOD PRESSURE: 104 MMHG | DIASTOLIC BLOOD PRESSURE: 70 MMHG

## 2022-06-29 DIAGNOSIS — Z00.00 PREVENTATIVE HEALTH CARE: ICD-10-CM

## 2022-06-29 DIAGNOSIS — F90.0 ATTENTION DEFICIT HYPERACTIVITY DISORDER (ADHD), PREDOMINANTLY INATTENTIVE TYPE: Primary | ICD-10-CM

## 2022-06-29 PROCEDURE — 99396 PREV VISIT EST AGE 40-64: CPT | Performed by: FAMILY MEDICINE

## 2022-06-29 RX ORDER — DEXTROAMPHETAMINE SACCHARATE, AMPHETAMINE ASPARTATE, DEXTROAMPHETAMINE SULFATE AND AMPHETAMINE SULFATE 5; 5; 5; 5 MG/1; MG/1; MG/1; MG/1
TABLET ORAL
Qty: 90 TABLET | Refills: 0 | Status: SHIPPED | OUTPATIENT
Start: 2022-06-29 | End: 2022-06-29

## 2022-06-29 RX ORDER — DEXTROAMPHETAMINE SACCHARATE, AMPHETAMINE ASPARTATE, DEXTROAMPHETAMINE SULFATE AND AMPHETAMINE SULFATE 5; 5; 5; 5 MG/1; MG/1; MG/1; MG/1
TABLET ORAL
Qty: 90 TABLET | Refills: 0 | Status: SHIPPED | OUTPATIENT
Start: 2022-06-29 | End: 2022-08-26 | Stop reason: SDUPTHER

## 2022-08-26 DIAGNOSIS — F90.0 ATTENTION DEFICIT HYPERACTIVITY DISORDER (ADHD), PREDOMINANTLY INATTENTIVE TYPE: ICD-10-CM

## 2022-08-28 RX ORDER — DEXTROAMPHETAMINE SACCHARATE, AMPHETAMINE ASPARTATE, DEXTROAMPHETAMINE SULFATE AND AMPHETAMINE SULFATE 5; 5; 5; 5 MG/1; MG/1; MG/1; MG/1
TABLET ORAL
Qty: 90 TABLET | Refills: 0 | Status: SHIPPED | OUTPATIENT
Start: 2022-08-28 | End: 2022-08-29 | Stop reason: SDUPTHER

## 2022-08-29 DIAGNOSIS — F90.0 ATTENTION DEFICIT HYPERACTIVITY DISORDER (ADHD), PREDOMINANTLY INATTENTIVE TYPE: ICD-10-CM

## 2022-08-29 NOTE — TELEPHONE ENCOUNTER
"Incoming Refill Request    Medication requested (name and dose):   amphetamine-dextroamphetamine (Adderall) 20 MG tablet   0 ordered         Pharmacy where request should be sent: [facility] #[id number] - [place] [address] AT [facility] - [phone]  - [phone] FX  [phone]    Additional details provided by patient: PATIENT ORIGINALLY HAD THIS SENT TO AMANDA AT [place] BUT THEY ARE OUT OF THE MEDICATION, HE IS WANTING IT SENT TO [facility] IN [place], [place]    Best call back number: [phone]    Does the patient have less than a 3 day supply:  [x] Yes  [] No    Kristi Burk Rep  08/29/22, 14:13 EDT            "

## 2022-08-30 RX ORDER — DEXTROAMPHETAMINE SACCHARATE, AMPHETAMINE ASPARTATE, DEXTROAMPHETAMINE SULFATE AND AMPHETAMINE SULFATE 5; 5; 5; 5 MG/1; MG/1; MG/1; MG/1
TABLET ORAL
Qty: 90 TABLET | Refills: 0 | Status: SHIPPED | OUTPATIENT
Start: 2022-08-30 | End: 2022-10-11 | Stop reason: SDUPTHER

## 2022-09-08 ENCOUNTER — OFFICE VISIT (OUTPATIENT)
Dept: FAMILY MEDICINE CLINIC | Facility: CLINIC | Age: 42
End: 2022-09-08

## 2022-09-08 VITALS
RESPIRATION RATE: 16 BRPM | HEART RATE: 90 BPM | HEIGHT: 66 IN | SYSTOLIC BLOOD PRESSURE: 110 MMHG | TEMPERATURE: 97.7 F | BODY MASS INDEX: 21.92 KG/M2 | WEIGHT: 136.4 LBS | DIASTOLIC BLOOD PRESSURE: 66 MMHG | OXYGEN SATURATION: 98 %

## 2022-09-08 DIAGNOSIS — H65.02 ACUTE SEROUS OTITIS MEDIA OF LEFT EAR, RECURRENCE NOT SPECIFIED: Primary | ICD-10-CM

## 2022-09-08 DIAGNOSIS — J01.01 ACUTE RECURRENT MAXILLARY SINUSITIS: ICD-10-CM

## 2022-09-08 PROCEDURE — 99213 OFFICE O/P EST LOW 20 MIN: CPT | Performed by: PHYSICIAN ASSISTANT

## 2022-09-08 RX ORDER — CEFDINIR 300 MG/1
300 CAPSULE ORAL 2 TIMES DAILY
Qty: 20 CAPSULE | Refills: 0 | Status: SHIPPED | OUTPATIENT
Start: 2022-09-08 | End: 2022-09-18

## 2022-09-08 NOTE — PROGRESS NOTES
"Subjective   Bladimir Dumont is a 42 y.o. male.     Chief Complaint   Patient presents with   • Cough   • Nasal Congestion   • Earache     Left ear pain,sinus pressure in his face. Sorethroat  in the mornings. All has been going on for a week.        /66   Pulse 90   Temp 97.7 °F (36.5 °C) (Infrared)   Resp 16   Ht 167.6 cm (65.98\")   Wt 61.9 kg (136 lb 6.4 oz)   SpO2 98%   BMI 22.03 kg/m²     BP Readings from Last 3 Encounters:   09/08/22 110/66   06/29/22 104/70   03/15/22 127/78       Wt Readings from Last 3 Encounters:   09/08/22 61.9 kg (136 lb 6.4 oz)   06/29/22 61.7 kg (136 lb)   03/15/22 64.1 kg (141 lb 6.4 oz)       HPI Patient presents to the clinic for complaints of sinus pain that has been present for 1 week. Has associated congestion and ear pain with the sinus pain. Does  have a history of recurrent sinus infections. OTC treatments have included sinus and vicks nasal spray. We saw him recently for acute otitis media and sent him to ENT for the extent and they placed a tube.       The following portions of the patient's history were reviewed and updated as appropriate: allergies, current medications, past family history, past medical history, past social history, past surgical history and problem list.    Review of Systems    Objective   Physical Exam  Constitutional:       Appearance: Normal appearance.   HENT:      Right Ear: Tympanic membrane normal.      Ears:      Comments: Right ear with tube in place. Left tympanic membrane erythematous and bulging      Nose: Congestion present.      Mouth/Throat:      Pharynx: Posterior oropharyngeal erythema present.   Eyes:      Extraocular Movements: Extraocular movements intact.      Pupils: Pupils are equal, round, and reactive to light.   Cardiovascular:      Rate and Rhythm: Normal rate.      Heart sounds: No murmur heard.  Pulmonary:      Effort: Pulmonary effort is normal.      Breath sounds: No wheezing.   Neurological:      General: No " focal deficit present.      Mental Status: He is alert and oriented to person, place, and time.   Psychiatric:         Mood and Affect: Mood normal.         Behavior: Behavior normal.           Diagnoses and all orders for this visit:    1. Acute serous otitis media of left ear, recurrence not specified (Primary)  Comments:  likely will need tube in that left ear. follow up with  ENT. Right ear with tube looks fine.     2. Acute recurrent maxillary sinusitis    Other orders  -     cefdinir (OMNICEF) 300 MG capsule; Take 1 capsule by mouth 2 (Two) Times a Day for 10 days.  Dispense: 20 capsule; Refill: 0        Return if symptoms worsen or fail to improve.

## 2022-09-12 ENCOUNTER — OFFICE VISIT (OUTPATIENT)
Dept: SLEEP MEDICINE | Facility: CLINIC | Age: 42
End: 2022-09-12

## 2022-09-12 VITALS
HEIGHT: 66 IN | HEART RATE: 88 BPM | DIASTOLIC BLOOD PRESSURE: 77 MMHG | WEIGHT: 136.4 LBS | SYSTOLIC BLOOD PRESSURE: 123 MMHG | BODY MASS INDEX: 21.92 KG/M2

## 2022-09-12 DIAGNOSIS — G47.30 SLEEP APNEA, UNSPECIFIED TYPE: Primary | ICD-10-CM

## 2022-09-12 DIAGNOSIS — G47.8 NON-RESTORATIVE SLEEP: ICD-10-CM

## 2022-09-12 DIAGNOSIS — F51.04 PSYCHOPHYSIOLOGICAL INSOMNIA: ICD-10-CM

## 2022-09-12 DIAGNOSIS — G47.10 HYPERSOMNIA: ICD-10-CM

## 2022-09-12 DIAGNOSIS — R06.83 SNORING: ICD-10-CM

## 2022-09-12 PROCEDURE — 99214 OFFICE O/P EST MOD 30 MIN: CPT | Performed by: FAMILY MEDICINE

## 2022-09-12 PROCEDURE — G0463 HOSPITAL OUTPT CLINIC VISIT: HCPCS

## 2022-09-12 NOTE — PROGRESS NOTES
"[facility]Consultation       Reason for Consultation: ADHD      Patient Care Team:  Kanu Tay MD as PCP - General (Family Medicine)  Pastora Wiggins MD as Consulting Physician (Sleep Medicine)      History of present illness:  Thank you for asking me to see your patient.  The patient is a 42 y.o. male Per primary care records has been on Adderall for many years for ADHD.  Predominantly inattentive type.  Change jobs recently height based work; Adderall is currently 40 mg in the morning 10 mg in the afternoon and another 10 mg after lunch however.  Concerns regarding insomnia later at night.  No history of prior sleep study or tonsillectomy.  Patient reports hypersomnia nonrestorative sleep difficulty driving due to sleepiness.  Symptoms difficulty falling asleep and staying asleep restless sleep more sleepy when he increases sleep time.  No family history of sleep apnea is aware.    With 9 PM to 11 PM sleep latency few minutes to hours wake time 5:20 AM sleeps around 6 to 8 hours 0 naps no rotating shifts.      Social History: 1 pack of cigars a day 3 caffeine beverages a day no drug use no alcohol use    Allergies:  Penicillin g    Family History: HERNANDO now       Current Outpatient Medications:   •  amphetamine-dextroamphetamine (Adderall) 20 MG tablet, Take two po in AM.    Take one in early afternoon., Disp: 90 tablet, Rfl: 0  •  cefdinir (OMNICEF) 300 MG capsule, Take 1 capsule by mouth 2 (Two) Times a Day for 10 days., Disp: 20 capsule, Rfl: 0    Vital Signs:    Vitals:    09/12/22 1342   BP: 123/77   BP Location: Left arm   Patient Position: Sitting   Cuff Size: Adult   Pulse: 88   Weight: 61.9 kg (136 lb 6.4 oz)   Height: 167.6 cm (65.98\")      Body mass index is 22.03 kg/m².         REVIEW OF SYSTEMS.  Full review of systems available on the intake form which is scanned in the media tab.  The relevant positive are noted below  1. Daytime excessive sleepiness with [place] " "Sleepiness Scale :Total score: 12   2. Snoring  3. Irregular heartbeat      Physical exam:  Vitals:    09/12/22 1342   BP: 123/77   BP Location: Left arm   Patient Position: Sitting   Cuff Size: Adult   Pulse: 88   Weight: 61.9 kg (136 lb 6.4 oz)   Height: 167.6 cm (65.98\")    Body mass index is 22.03 kg/m².    HEENT: Head is atraumatic, normocephalic  Eyes: pupils are round equal and reacting to light and accommodation, conjunctiva normal  RESPIRATORY SYSTEM: Regular respirations  CARDIOVASULAR SYSTEM: Regular rate  EXTREMITES: No cyanosis, clubbing  NEUROLOGICAL SYSTEM: Oriented x 3, no gross motor defects, gait normal      Impression:  1. Sleep apnea, unspecified type    2. Snoring    3. Psychophysiological insomnia    4. Hypersomnia    5. Non-restorative sleep        Plan:    Good sleep hygiene measures should be maintained.     I discussed the pathophysiology of obstructive sleep apnea with the patient.  We discussed the adverse outcomes associated with untreated sleep-disordered breathing.  We discussed treatment modalities of obstructive sleep apnea including CPAP device as well as oral mandibular advancement device. Sleep study will be scheduled to establish definitive diagnosis of sleep disorder breathing.  Patient has narrow oropharyngeal structure.  Caution during activities that require prolonged concentration is strongly advised.  Patient will be notified of sleep study results after sleep study is completed.  If sleep apnea is only mild,  oral mandibular advancement device may be one of the treatment options.  However if sleep apnea is moderately severe, CPAP treatment will be strongly encouraged.  The patient is not opposed to treatment with CPAP device if we confirm significant obstructive sleep apnea on polysomnography.     If HST negative consider CBT-I with Dr. Raúl Meneses.  Concern for primary insomnia.  Also advised [employer] hamlet for guided meditation to help for sleep.  May look into CBT " before bed as well.  Continue care per primary care in terms of Adderall dosing as well this is likely not the primary cause of insomnia can be contributing.    Thank you for allowing me to participate in your patient's care.    Pastora Wiggins MD  Sleep Medicine  09/12/22  13:48 EDT

## 2022-10-11 DIAGNOSIS — F90.0 ATTENTION DEFICIT HYPERACTIVITY DISORDER (ADHD), PREDOMINANTLY INATTENTIVE TYPE: ICD-10-CM

## 2022-10-11 RX ORDER — DEXTROAMPHETAMINE SACCHARATE, AMPHETAMINE ASPARTATE, DEXTROAMPHETAMINE SULFATE AND AMPHETAMINE SULFATE 5; 5; 5; 5 MG/1; MG/1; MG/1; MG/1
TABLET ORAL
Qty: 90 TABLET | Refills: 0 | Status: SHIPPED | OUTPATIENT
Start: 2022-10-11 | End: 2022-11-22 | Stop reason: SDUPTHER

## 2022-10-11 NOTE — TELEPHONE ENCOUNTER
Caller: Bladimir Dumont    Relationship: Self    Best call back number: [phone]    Requested Prescriptions:   Requested Prescriptions     Pending Prescriptions Disp Refills   • amphetamine-dextroamphetamine (Adderall) 20 MG tablet 90 tablet 0     Sig: Take two po in AM.    Take one in early afternoon.        Pharmacy where request should be sent: [id number] - [place]  [address] AT [facility] OF  [address] [facility] - [phone] [facility] [phone] FX     Additional details provided by patient: HAS LESS THAN 3 DAY SUPPY    Does the patient have less than a 3 day supply:  [x] Yes  [] No    Kristi De Jesus Rep   10/11/22 15:27 EDT

## 2022-11-22 DIAGNOSIS — F90.0 ATTENTION DEFICIT HYPERACTIVITY DISORDER (ADHD), PREDOMINANTLY INATTENTIVE TYPE: ICD-10-CM

## 2022-11-22 RX ORDER — DEXTROAMPHETAMINE SACCHARATE, AMPHETAMINE ASPARTATE, DEXTROAMPHETAMINE SULFATE AND AMPHETAMINE SULFATE 5; 5; 5; 5 MG/1; MG/1; MG/1; MG/1
TABLET ORAL
Qty: 90 TABLET | Refills: 0 | Status: SHIPPED | OUTPATIENT
Start: 2022-11-22 | End: 2023-01-11 | Stop reason: SDUPTHER

## 2022-12-15 ENCOUNTER — TELEPHONE (OUTPATIENT)
Dept: FAMILY MEDICINE CLINIC | Facility: CLINIC | Age: 42
End: 2022-12-15

## 2022-12-15 NOTE — TELEPHONE ENCOUNTER
"HUB READ TO PATIENT:    PATIENT DIDN'T ANSWER        FOR KASSANDRA TO SAY:     \"No he does not meet criteria for paxlovid he just needs to treat symptoms with over the counter medications\"  "

## 2022-12-15 NOTE — TELEPHONE ENCOUNTER
Caller: Tim Bladimir S    Relationship: Self    Best call back number: [phone]     Requested Prescriptions: COVID MEDICATION       Pharmacy where request should be sent:    [facility] #[id number] - [place] - [address] AT [address]  - [phone] PH - [phone] FX  [phone]  Additional details provided by patient: PATIENT IS CALLING TO STATE HE JUST TESTED POSITIVE FOR COVID.  HE STATES HE STARTED HAVING SYMPTOMS SINCE YESTERDAY.  HE STATES HE HAS LOSS OF TASTE, HAS COUGH, FEVER, AND CONGESTION.  HE IS W ANTING TO KNOW IF HE CAN GET MEDICATION TO HELP HIM GET BETTER SOONER.    Does the patient have less than a 3 day supply:  [x] Yes  [] No    Would you like a call back once the refill request has been completed: [x] Yes [] No    If the office needs to give you a call back, can they leave a voicemail: [x] Yes [] No    Kristi Novoa Rep   12/15/22 10:54 EST     PLEASE ADVISE.

## 2022-12-15 NOTE — TELEPHONE ENCOUNTER
No he does not meet criteria for paxlovid he just needs to treat symptoms with over the counter medications

## 2022-12-15 NOTE — TELEPHONE ENCOUNTER
"PATIENT DIDN'T ANSWER      FOR HUB TO SAY:    \"No he does not meet criteria for paxlovid he just needs to treat symptoms with over the counter medications\"  "

## 2023-01-11 DIAGNOSIS — F90.0 ATTENTION DEFICIT HYPERACTIVITY DISORDER (ADHD), PREDOMINANTLY INATTENTIVE TYPE: ICD-10-CM

## 2023-01-12 RX ORDER — DEXTROAMPHETAMINE SACCHARATE, AMPHETAMINE ASPARTATE, DEXTROAMPHETAMINE SULFATE AND AMPHETAMINE SULFATE 5; 5; 5; 5 MG/1; MG/1; MG/1; MG/1
TABLET ORAL
Qty: 90 TABLET | Refills: 0 | Status: SHIPPED | OUTPATIENT
Start: 2023-01-12 | End: 2023-02-24 | Stop reason: SDUPTHER

## 2023-02-24 DIAGNOSIS — F90.0 ATTENTION DEFICIT HYPERACTIVITY DISORDER (ADHD), PREDOMINANTLY INATTENTIVE TYPE: ICD-10-CM

## 2023-02-28 RX ORDER — DEXTROAMPHETAMINE SACCHARATE, AMPHETAMINE ASPARTATE, DEXTROAMPHETAMINE SULFATE AND AMPHETAMINE SULFATE 5; 5; 5; 5 MG/1; MG/1; MG/1; MG/1
TABLET ORAL
Qty: 90 TABLET | Refills: 0 | Status: SHIPPED | OUTPATIENT
Start: 2023-02-28

## 2023-04-10 DIAGNOSIS — F90.0 ATTENTION DEFICIT HYPERACTIVITY DISORDER (ADHD), PREDOMINANTLY INATTENTIVE TYPE: ICD-10-CM

## 2023-04-10 RX ORDER — DEXTROAMPHETAMINE SACCHARATE, AMPHETAMINE ASPARTATE, DEXTROAMPHETAMINE SULFATE AND AMPHETAMINE SULFATE 5; 5; 5; 5 MG/1; MG/1; MG/1; MG/1
TABLET ORAL
Qty: 90 TABLET | Refills: 0 | Status: SHIPPED | OUTPATIENT
Start: 2023-04-10

## 2023-05-24 DIAGNOSIS — F90.0 ATTENTION DEFICIT HYPERACTIVITY DISORDER (ADHD), PREDOMINANTLY INATTENTIVE TYPE: ICD-10-CM

## 2023-05-24 RX ORDER — DEXTROAMPHETAMINE SACCHARATE, AMPHETAMINE ASPARTATE, DEXTROAMPHETAMINE SULFATE AND AMPHETAMINE SULFATE 5; 5; 5; 5 MG/1; MG/1; MG/1; MG/1
TABLET ORAL
Qty: 90 TABLET | Refills: 0 | Status: SHIPPED | OUTPATIENT
Start: 2023-05-24

## 2023-08-19 DIAGNOSIS — F90.0 ATTENTION DEFICIT HYPERACTIVITY DISORDER (ADHD), PREDOMINANTLY INATTENTIVE TYPE: ICD-10-CM

## 2023-08-21 RX ORDER — DEXTROAMPHETAMINE SACCHARATE, AMPHETAMINE ASPARTATE, DEXTROAMPHETAMINE SULFATE AND AMPHETAMINE SULFATE 5; 5; 5; 5 MG/1; MG/1; MG/1; MG/1
TABLET ORAL
Qty: 90 TABLET | Refills: 0 | Status: SHIPPED | OUTPATIENT
Start: 2023-08-21 | End: 2023-08-25 | Stop reason: SDUPTHER

## 2023-08-22 DIAGNOSIS — F90.0 ATTENTION DEFICIT HYPERACTIVITY DISORDER (ADHD), PREDOMINANTLY INATTENTIVE TYPE: ICD-10-CM

## 2023-08-22 NOTE — TELEPHONE ENCOUNTER
PATIENT CALLED TO LET DR. INFANTE KNOW HE FOUND THE MEDICATION IN STOCK AT [facility] IN [place]. THEY HAVE 60 TABLETS AND HE WANTS TO KNOW IF PRESCRIPTION CAN BE SENT OVER TO THEM FOR 60 TABLETS SO HE CAN AT LEAST HAVE THAT FOR THIS MONTH.    [facility] - [place], [place] - [address]  - [phone]  - [phone] FX     PLEASE ADVISE    CALLBACK: [phone]

## 2023-08-22 NOTE — TELEPHONE ENCOUNTER
Caller: Bladimir Dumont    Relationship: Self    Best call back number:     Bladimir Dumont (Self) [phone] (Mobile)       What was the call regarding: CAN YOU WRITE A PRESCRIPTION (HARD COPY) FOR THE PATIENT TO TAKE TO PHARMACIES AND SEE WHICH ONE HAS HIS ADDERALL RX     HE IS HAVING ISSUES FINDING THE MEDICATION        Is it okay if the provider responds through MyChart: CALL WHEN READY

## 2023-08-25 DIAGNOSIS — F90.0 ATTENTION DEFICIT HYPERACTIVITY DISORDER (ADHD), PREDOMINANTLY INATTENTIVE TYPE: ICD-10-CM

## 2023-08-25 RX ORDER — DEXTROAMPHETAMINE SACCHARATE, AMPHETAMINE ASPARTATE, DEXTROAMPHETAMINE SULFATE AND AMPHETAMINE SULFATE 5; 5; 5; 5 MG/1; MG/1; MG/1; MG/1
TABLET ORAL
Qty: 90 TABLET | Refills: 0 | Status: SHIPPED | OUTPATIENT
Start: 2023-08-25

## 2023-08-25 RX ORDER — DEXTROAMPHETAMINE SACCHARATE, AMPHETAMINE ASPARTATE, DEXTROAMPHETAMINE SULFATE AND AMPHETAMINE SULFATE 5; 5; 5; 5 MG/1; MG/1; MG/1; MG/1
TABLET ORAL
Qty: 60 TABLET | Refills: 0 | Status: CANCELLED | OUTPATIENT
Start: 2023-08-25

## 2023-08-25 NOTE — TELEPHONE ENCOUNTER
THIS WAS NEVER SIGNED TO [facility] SO PATIENT FOUND [facility] HAS THIS    PENDING FOR [facility]    PATIENT CAME IN TODAY AND WAS VERY UPSET.

## 2023-10-17 DIAGNOSIS — F90.0 ATTENTION DEFICIT HYPERACTIVITY DISORDER (ADHD), PREDOMINANTLY INATTENTIVE TYPE: ICD-10-CM

## 2023-10-17 RX ORDER — DEXTROAMPHETAMINE SACCHARATE, AMPHETAMINE ASPARTATE, DEXTROAMPHETAMINE SULFATE AND AMPHETAMINE SULFATE 5; 5; 5; 5 MG/1; MG/1; MG/1; MG/1
TABLET ORAL
Qty: 90 TABLET | Refills: 0 | Status: SHIPPED | OUTPATIENT
Start: 2023-10-17

## 2023-11-20 DIAGNOSIS — F90.0 ATTENTION DEFICIT HYPERACTIVITY DISORDER (ADHD), PREDOMINANTLY INATTENTIVE TYPE: ICD-10-CM

## 2023-11-20 RX ORDER — DEXTROAMPHETAMINE SACCHARATE, AMPHETAMINE ASPARTATE, DEXTROAMPHETAMINE SULFATE AND AMPHETAMINE SULFATE 5; 5; 5; 5 MG/1; MG/1; MG/1; MG/1
TABLET ORAL
Qty: 90 TABLET | Refills: 0 | Status: SHIPPED | OUTPATIENT
Start: 2023-11-20

## 2024-01-04 DIAGNOSIS — F90.0 ATTENTION DEFICIT HYPERACTIVITY DISORDER (ADHD), PREDOMINANTLY INATTENTIVE TYPE: ICD-10-CM

## 2024-01-04 RX ORDER — DEXTROAMPHETAMINE SACCHARATE, AMPHETAMINE ASPARTATE, DEXTROAMPHETAMINE SULFATE AND AMPHETAMINE SULFATE 5; 5; 5; 5 MG/1; MG/1; MG/1; MG/1
TABLET ORAL
Qty: 90 TABLET | Refills: 0 | Status: SHIPPED | OUTPATIENT
Start: 2024-01-04

## 2024-02-13 DIAGNOSIS — F90.0 ATTENTION DEFICIT HYPERACTIVITY DISORDER (ADHD), PREDOMINANTLY INATTENTIVE TYPE: ICD-10-CM

## 2024-02-13 RX ORDER — DEXTROAMPHETAMINE SACCHARATE, AMPHETAMINE ASPARTATE, DEXTROAMPHETAMINE SULFATE AND AMPHETAMINE SULFATE 5; 5; 5; 5 MG/1; MG/1; MG/1; MG/1
TABLET ORAL
Qty: 90 TABLET | Refills: 0 | Status: SHIPPED | OUTPATIENT
Start: 2024-02-13

## 2024-03-13 DIAGNOSIS — F90.0 ATTENTION DEFICIT HYPERACTIVITY DISORDER (ADHD), PREDOMINANTLY INATTENTIVE TYPE: ICD-10-CM

## 2024-03-14 RX ORDER — DEXTROAMPHETAMINE SACCHARATE, AMPHETAMINE ASPARTATE, DEXTROAMPHETAMINE SULFATE AND AMPHETAMINE SULFATE 5; 5; 5; 5 MG/1; MG/1; MG/1; MG/1
TABLET ORAL
Qty: 90 TABLET | Refills: 0 | OUTPATIENT
Start: 2024-03-14

## 2024-03-18 NOTE — TELEPHONE ENCOUNTER
Gave message to patient at 9:14am.  He states that he has no insurance so there is no way he can come into the office because he cannot afford it.  I told him that we would be unable to fill the medication until he is seen.

## 2024-03-25 DIAGNOSIS — F90.0 ATTENTION DEFICIT HYPERACTIVITY DISORDER (ADHD), PREDOMINANTLY INATTENTIVE TYPE: ICD-10-CM

## 2024-03-25 RX ORDER — DEXTROAMPHETAMINE SACCHARATE, AMPHETAMINE ASPARTATE, DEXTROAMPHETAMINE SULFATE AND AMPHETAMINE SULFATE 5; 5; 5; 5 MG/1; MG/1; MG/1; MG/1
TABLET ORAL
Qty: 90 TABLET | Refills: 0 | Status: CANCELLED | OUTPATIENT
Start: 2024-03-25

## 2024-03-26 NOTE — TELEPHONE ENCOUNTER
"Gave complete message to patient at 9:57am that he would need to make an appt and keep the appt to be seen by someone in the office in order for us to continue filling the medication.  Patient said \"OK\" and then hung up.  "

## 2024-03-26 NOTE — TELEPHONE ENCOUNTER
Patient wants a refill on Adderall but he has not been seen since 2022.     He needs to make appt and keep it or we will no longer fill this.

## 2024-04-15 DIAGNOSIS — F90.0 ATTENTION DEFICIT HYPERACTIVITY DISORDER (ADHD), PREDOMINANTLY INATTENTIVE TYPE: ICD-10-CM

## 2024-04-15 RX ORDER — DEXTROAMPHETAMINE SACCHARATE, AMPHETAMINE ASPARTATE, DEXTROAMPHETAMINE SULFATE AND AMPHETAMINE SULFATE 5; 5; 5; 5 MG/1; MG/1; MG/1; MG/1
TABLET ORAL
Qty: 90 TABLET | Refills: 0 | Status: SHIPPED | OUTPATIENT
Start: 2024-04-15

## 2024-05-28 DIAGNOSIS — F90.0 ATTENTION DEFICIT HYPERACTIVITY DISORDER (ADHD), PREDOMINANTLY INATTENTIVE TYPE: ICD-10-CM

## 2024-05-28 RX ORDER — DEXTROAMPHETAMINE SACCHARATE, AMPHETAMINE ASPARTATE, DEXTROAMPHETAMINE SULFATE AND AMPHETAMINE SULFATE 5; 5; 5; 5 MG/1; MG/1; MG/1; MG/1
TABLET ORAL
Qty: 90 TABLET | Refills: 0 | Status: SHIPPED | OUTPATIENT
Start: 2024-05-28

## 2024-07-09 DIAGNOSIS — F90.0 ATTENTION DEFICIT HYPERACTIVITY DISORDER (ADHD), PREDOMINANTLY INATTENTIVE TYPE: ICD-10-CM

## 2024-07-09 RX ORDER — DEXTROAMPHETAMINE SACCHARATE, AMPHETAMINE ASPARTATE, DEXTROAMPHETAMINE SULFATE AND AMPHETAMINE SULFATE 5; 5; 5; 5 MG/1; MG/1; MG/1; MG/1
TABLET ORAL
Qty: 90 TABLET | Refills: 0 | Status: SHIPPED | OUTPATIENT
Start: 2024-07-09 | End: 2024-07-10 | Stop reason: SDUPTHER

## 2024-07-10 DIAGNOSIS — F90.0 ATTENTION DEFICIT HYPERACTIVITY DISORDER (ADHD), PREDOMINANTLY INATTENTIVE TYPE: ICD-10-CM

## 2024-07-10 RX ORDER — DEXTROAMPHETAMINE SACCHARATE, AMPHETAMINE ASPARTATE, DEXTROAMPHETAMINE SULFATE AND AMPHETAMINE SULFATE 5; 5; 5; 5 MG/1; MG/1; MG/1; MG/1
TABLET ORAL
Qty: 90 TABLET | Refills: 0 | Status: SHIPPED | OUTPATIENT
Start: 2024-07-10

## 2024-08-29 DIAGNOSIS — F90.0 ATTENTION DEFICIT HYPERACTIVITY DISORDER (ADHD), PREDOMINANTLY INATTENTIVE TYPE: ICD-10-CM

## 2024-08-29 RX ORDER — DEXTROAMPHETAMINE SACCHARATE, AMPHETAMINE ASPARTATE, DEXTROAMPHETAMINE SULFATE AND AMPHETAMINE SULFATE 5; 5; 5; 5 MG/1; MG/1; MG/1; MG/1
TABLET ORAL
Qty: 90 TABLET | Refills: 0 | Status: SHIPPED | OUTPATIENT
Start: 2024-08-29

## 2024-10-21 ENCOUNTER — OFFICE VISIT (OUTPATIENT)
Dept: FAMILY MEDICINE CLINIC | Facility: CLINIC | Age: 44
End: 2024-10-21

## 2024-10-21 VITALS
DIASTOLIC BLOOD PRESSURE: 78 MMHG | BODY MASS INDEX: 22.73 KG/M2 | SYSTOLIC BLOOD PRESSURE: 122 MMHG | OXYGEN SATURATION: 99 % | HEART RATE: 78 BPM | WEIGHT: 141.4 LBS | HEIGHT: 66 IN | RESPIRATION RATE: 18 BRPM

## 2024-10-21 DIAGNOSIS — F90.0 ATTENTION DEFICIT HYPERACTIVITY DISORDER (ADHD), PREDOMINANTLY INATTENTIVE TYPE: ICD-10-CM

## 2024-10-21 PROCEDURE — 99213 OFFICE O/P EST LOW 20 MIN: CPT | Performed by: FAMILY MEDICINE

## 2024-10-21 RX ORDER — DEXTROAMPHETAMINE SACCHARATE, AMPHETAMINE ASPARTATE, DEXTROAMPHETAMINE SULFATE AND AMPHETAMINE SULFATE 5; 5; 5; 5 MG/1; MG/1; MG/1; MG/1
TABLET ORAL
Qty: 90 TABLET | Refills: 0 | Status: SHIPPED | OUTPATIENT
Start: 2024-10-21

## 2024-10-21 NOTE — PROGRESS NOTES
"Chief Complaint  ADD    Subjective        Bladimir Dumont presents to [facility]  History of Present Illness  The patient is a 44-year-old male who presents for a checkup on his attention deficit disorder.    He is currently on Adderall, taking two doses in the morning and one in the afternoon. He reports that the medication is effective and does not interfere with his sleep. He is seeking a refill of his prescription.       Objective   Vital Signs:  /78   Pulse 78   Resp 18   Ht 167.6 cm (65.98\")   Wt 64.1 kg (141 lb 6.4 oz)   SpO2 99%   BMI 22.84 kg/m²   Estimated body mass index is 22.84 kg/m² as calculated from the following:    Height as of this encounter: 167.6 cm (65.98\").    Weight as of this encounter: 64.1 kg (141 lb 6.4 oz).       BMI is within normal parameters. No other follow-up for BMI required.      Physical Exam  Vitals reviewed.   Constitutional:       Appearance: Normal appearance. He is well-developed and normal weight.   HENT:      Head: Normocephalic and atraumatic.      Right Ear: Tympanic membrane, ear canal and external ear normal.      Left Ear: Tympanic membrane, ear canal and external ear normal.      Nose: Nose normal.      Mouth/Throat:      Mouth: Mucous membranes are moist.      Pharynx: Oropharynx is clear. No oropharyngeal exudate.   Eyes:      Extraocular Movements: Extraocular movements intact.      Conjunctiva/sclera: Conjunctivae normal.      Pupils: Pupils are equal, round, and reactive to light.   Cardiovascular:      Rate and Rhythm: Normal rate and regular rhythm.      Pulses: Normal pulses.      Heart sounds: Normal heart sounds.   Pulmonary:      Effort: Pulmonary effort is normal.      Breath sounds: Normal breath sounds.   Abdominal:      General: Bowel sounds are normal.      Palpations: Abdomen is soft.   Musculoskeletal:         General: Normal range of motion.      Cervical back: Normal range of motion and neck supple. "   Skin:     General: Skin is warm and dry.   Neurological:      General: No focal deficit present.      Mental Status: He is alert and oriented to person, place, and time. Mental status is at baseline.   Psychiatric:         Mood and Affect: Mood normal.         Behavior: Behavior normal.         Thought Content: Thought content normal.         Judgment: Judgment normal.        Result Review :      Results      Assessment & Plan  1. Attention Deficit Disorder.  He is currently taking Adderall 20 mg, with a regimen of 2 tablets in the morning and 1 in the afternoon. He reports that the medication is effective in helping him multitask and stay focused. He occasionally skips the afternoon dose on less busy days or weekends. He is experiencing no side effects from the medication. The current prescription will be continued and sent to [facility] pharmacy on [facility] [address].              Follow Up     Return in about 1 year (around 10/21/2025), or if symptoms worsen or fail to improve, for Annual physical.  Patient was given instructions and counseling regarding his condition or for health maintenance advice. Please see specific information pulled into the AVS if appropriate.     Patient or patient representative verbalized consent for the use of Ambient Listening during the visit with  Kanu Tay MD for chart documentation. 10/21/2024  09:52 EDT

## 2024-12-17 DIAGNOSIS — F90.0 ATTENTION DEFICIT HYPERACTIVITY DISORDER (ADHD), PREDOMINANTLY INATTENTIVE TYPE: ICD-10-CM

## 2024-12-17 RX ORDER — DEXTROAMPHETAMINE SACCHARATE, AMPHETAMINE ASPARTATE, DEXTROAMPHETAMINE SULFATE AND AMPHETAMINE SULFATE 5; 5; 5; 5 MG/1; MG/1; MG/1; MG/1
TABLET ORAL
Qty: 90 TABLET | Refills: 0 | Status: SHIPPED | OUTPATIENT
Start: 2024-12-17

## 2025-02-07 DIAGNOSIS — F90.0 ATTENTION DEFICIT HYPERACTIVITY DISORDER (ADHD), PREDOMINANTLY INATTENTIVE TYPE: ICD-10-CM

## 2025-02-07 RX ORDER — DEXTROAMPHETAMINE SACCHARATE, AMPHETAMINE ASPARTATE, DEXTROAMPHETAMINE SULFATE AND AMPHETAMINE SULFATE 5; 5; 5; 5 MG/1; MG/1; MG/1; MG/1
TABLET ORAL
Qty: 90 TABLET | Refills: 0 | Status: SHIPPED | OUTPATIENT
Start: 2025-02-07

## 2025-03-25 DIAGNOSIS — F90.0 ATTENTION DEFICIT HYPERACTIVITY DISORDER (ADHD), PREDOMINANTLY INATTENTIVE TYPE: ICD-10-CM

## 2025-03-25 RX ORDER — DEXTROAMPHETAMINE SACCHARATE, AMPHETAMINE ASPARTATE, DEXTROAMPHETAMINE SULFATE AND AMPHETAMINE SULFATE 5; 5; 5; 5 MG/1; MG/1; MG/1; MG/1
TABLET ORAL
Qty: 90 TABLET | Refills: 0 | Status: SHIPPED | OUTPATIENT
Start: 2025-03-25

## 2025-04-01 ENCOUNTER — PATIENT MESSAGE (OUTPATIENT)
Dept: FAMILY MEDICINE CLINIC | Facility: CLINIC | Age: 45
End: 2025-04-01

## 2025-04-01 DIAGNOSIS — F90.0 ATTENTION DEFICIT HYPERACTIVITY DISORDER (ADHD), PREDOMINANTLY INATTENTIVE TYPE: ICD-10-CM

## 2025-04-01 RX ORDER — DEXTROAMPHETAMINE SACCHARATE, AMPHETAMINE ASPARTATE, DEXTROAMPHETAMINE SULFATE AND AMPHETAMINE SULFATE 5; 5; 5; 5 MG/1; MG/1; MG/1; MG/1
TABLET ORAL
Qty: 90 TABLET | Refills: 0 | Status: SHIPPED | OUTPATIENT
Start: 2025-04-01 | End: 2025-04-03

## 2025-04-03 RX ORDER — DEXTROAMPHETAMINE SACCHARATE, AMPHETAMINE ASPARTATE, DEXTROAMPHETAMINE SULFATE AND AMPHETAMINE SULFATE 2.5; 2.5; 2.5; 2.5 MG/1; MG/1; MG/1; MG/1
TABLET ORAL
Qty: 180 TABLET | Refills: 0 | Status: SHIPPED | OUTPATIENT
Start: 2025-04-03

## 2025-05-22 DIAGNOSIS — F90.0 ATTENTION DEFICIT HYPERACTIVITY DISORDER (ADHD), PREDOMINANTLY INATTENTIVE TYPE: Primary | ICD-10-CM

## 2025-05-22 RX ORDER — DEXTROAMPHETAMINE SACCHARATE, AMPHETAMINE ASPARTATE, DEXTROAMPHETAMINE SULFATE AND AMPHETAMINE SULFATE 2.5; 2.5; 2.5; 2.5 MG/1; MG/1; MG/1; MG/1
TABLET ORAL
Qty: 180 TABLET | Refills: 0 | Status: SHIPPED | OUTPATIENT
Start: 2025-05-22

## 2025-07-15 ENCOUNTER — PATIENT MESSAGE (OUTPATIENT)
Dept: FAMILY MEDICINE CLINIC | Facility: CLINIC | Age: 45
End: 2025-07-15

## 2025-07-15 DIAGNOSIS — F90.0 ATTENTION DEFICIT HYPERACTIVITY DISORDER (ADHD), PREDOMINANTLY INATTENTIVE TYPE: ICD-10-CM

## 2025-07-16 RX ORDER — DEXTROAMPHETAMINE SACCHARATE, AMPHETAMINE ASPARTATE, DEXTROAMPHETAMINE SULFATE AND AMPHETAMINE SULFATE 2.5; 2.5; 2.5; 2.5 MG/1; MG/1; MG/1; MG/1
TABLET ORAL
Qty: 180 TABLET | Refills: 0 | Status: SHIPPED | OUTPATIENT
Start: 2025-07-16

## 2025-08-29 DIAGNOSIS — F90.0 ATTENTION DEFICIT HYPERACTIVITY DISORDER (ADHD), PREDOMINANTLY INATTENTIVE TYPE: ICD-10-CM

## 2025-08-29 RX ORDER — DEXTROAMPHETAMINE SACCHARATE, AMPHETAMINE ASPARTATE, DEXTROAMPHETAMINE SULFATE AND AMPHETAMINE SULFATE 2.5; 2.5; 2.5; 2.5 MG/1; MG/1; MG/1; MG/1
TABLET ORAL
Qty: 180 TABLET | Refills: 0 | Status: SHIPPED | OUTPATIENT
Start: 2025-08-29